# Patient Record
Sex: FEMALE | Race: AMERICAN INDIAN OR ALASKA NATIVE
[De-identification: names, ages, dates, MRNs, and addresses within clinical notes are randomized per-mention and may not be internally consistent; named-entity substitution may affect disease eponyms.]

---

## 2018-04-28 ENCOUNTER — HOSPITAL ENCOUNTER (INPATIENT)
Dept: HOSPITAL 5 - ED | Age: 63
LOS: 3 days | Discharge: HOME | DRG: 392 | End: 2018-05-01
Attending: INTERNAL MEDICINE | Admitting: INTERNAL MEDICINE
Payer: MEDICAID

## 2018-04-28 DIAGNOSIS — Z90.711: ICD-10-CM

## 2018-04-28 DIAGNOSIS — R00.1: ICD-10-CM

## 2018-04-28 DIAGNOSIS — Z96.643: ICD-10-CM

## 2018-04-28 DIAGNOSIS — K22.5: Primary | ICD-10-CM

## 2018-04-28 DIAGNOSIS — Z88.8: ICD-10-CM

## 2018-04-28 DIAGNOSIS — R06.02: ICD-10-CM

## 2018-04-28 DIAGNOSIS — Z87.891: ICD-10-CM

## 2018-04-28 DIAGNOSIS — Z86.73: ICD-10-CM

## 2018-04-28 DIAGNOSIS — Z88.0: ICD-10-CM

## 2018-04-28 DIAGNOSIS — F41.1: ICD-10-CM

## 2018-04-28 DIAGNOSIS — Z90.13: ICD-10-CM

## 2018-04-28 DIAGNOSIS — Z79.82: ICD-10-CM

## 2018-04-28 DIAGNOSIS — M81.0: ICD-10-CM

## 2018-04-28 DIAGNOSIS — I10: ICD-10-CM

## 2018-04-28 DIAGNOSIS — J43.9: ICD-10-CM

## 2018-04-28 LAB
BASOPHILS # (AUTO): 0 K/MM3 (ref 0–0.1)
BASOPHILS NFR BLD AUTO: 1.2 % (ref 0–1.8)
BUN SERPL-MCNC: 11 MG/DL (ref 7–17)
BUN/CREAT SERPL: 16 %
CALCIUM SERPL-MCNC: 9.3 MG/DL (ref 8.4–10.2)
EOSINOPHIL # BLD AUTO: 0.1 K/MM3 (ref 0–0.4)
EOSINOPHIL NFR BLD AUTO: 2.1 % (ref 0–4.3)
HCT VFR BLD CALC: 42.8 % (ref 30.3–42.9)
HEMOLYSIS INDEX: 18
HGB BLD-MCNC: 13.6 GM/DL (ref 10.1–14.3)
LYMPHOCYTES # BLD AUTO: 1.8 K/MM3 (ref 1.2–5.4)
LYMPHOCYTES NFR BLD AUTO: 47.3 % (ref 13.4–35)
MCH RBC QN AUTO: 27 PG (ref 28–32)
MCHC RBC AUTO-ENTMCNC: 32 % (ref 30–34)
MCV RBC AUTO: 84 FL (ref 79–97)
MONOCYTES # (AUTO): 0.3 K/MM3 (ref 0–0.8)
MONOCYTES % (AUTO): 8.7 % (ref 0–7.3)
PLATELET # BLD: 215 K/MM3 (ref 140–440)
RBC # BLD AUTO: 5.08 M/MM3 (ref 3.65–5.03)

## 2018-04-28 PROCEDURE — 70450 CT HEAD/BRAIN W/O DYE: CPT

## 2018-04-28 PROCEDURE — 93010 ELECTROCARDIOGRAM REPORT: CPT

## 2018-04-28 PROCEDURE — 93306 TTE W/DOPPLER COMPLETE: CPT

## 2018-04-28 PROCEDURE — 93005 ELECTROCARDIOGRAM TRACING: CPT

## 2018-04-28 PROCEDURE — 71275 CT ANGIOGRAPHY CHEST: CPT

## 2018-04-28 PROCEDURE — 84484 ASSAY OF TROPONIN QUANT: CPT

## 2018-04-28 PROCEDURE — 96374 THER/PROPH/DIAG INJ IV PUSH: CPT

## 2018-04-28 PROCEDURE — 94640 AIRWAY INHALATION TREATMENT: CPT

## 2018-04-28 PROCEDURE — 36415 COLL VENOUS BLD VENIPUNCTURE: CPT

## 2018-04-28 PROCEDURE — 71045 X-RAY EXAM CHEST 1 VIEW: CPT

## 2018-04-28 PROCEDURE — 80048 BASIC METABOLIC PNL TOTAL CA: CPT

## 2018-04-28 PROCEDURE — 94760 N-INVAS EAR/PLS OXIMETRY 1: CPT

## 2018-04-28 PROCEDURE — 85379 FIBRIN DEGRADATION QUANT: CPT

## 2018-04-28 PROCEDURE — 96375 TX/PRO/DX INJ NEW DRUG ADDON: CPT

## 2018-04-28 PROCEDURE — 85025 COMPLETE CBC W/AUTO DIFF WBC: CPT

## 2018-04-28 PROCEDURE — 83036 HEMOGLOBIN GLYCOSYLATED A1C: CPT

## 2018-04-28 PROCEDURE — 80053 COMPREHEN METABOLIC PANEL: CPT

## 2018-04-28 PROCEDURE — 84439 ASSAY OF FREE THYROXINE: CPT

## 2018-04-28 PROCEDURE — 84443 ASSAY THYROID STIM HORMONE: CPT

## 2018-04-28 PROCEDURE — 93970 EXTREMITY STUDY: CPT

## 2018-04-28 PROCEDURE — A9502 TC99M TETROFOSMIN: HCPCS

## 2018-04-28 PROCEDURE — 93017 CV STRESS TEST TRACING ONLY: CPT

## 2018-04-28 PROCEDURE — 78452 HT MUSCLE IMAGE SPECT MULT: CPT

## 2018-04-28 RX ADMIN — HEPARIN SODIUM SCH UNIT: 5000 INJECTION, SOLUTION INTRAVENOUS; SUBCUTANEOUS at 22:13

## 2018-04-28 RX ADMIN — ALPRAZOLAM PRN MG: 1 TABLET ORAL at 22:03

## 2018-04-28 RX ADMIN — MORPHINE SULFATE PRN MG: 2 INJECTION, SOLUTION INTRAMUSCULAR; INTRAVENOUS at 22:12

## 2018-04-28 RX ADMIN — ONDANSETRON PRN MG: 2 INJECTION INTRAMUSCULAR; INTRAVENOUS at 17:43

## 2018-04-28 RX ADMIN — ONDANSETRON PRN MG: 2 INJECTION INTRAMUSCULAR; INTRAVENOUS at 22:11

## 2018-04-28 RX ADMIN — Medication SCH ML: at 22:13

## 2018-04-28 RX ADMIN — DICYCLOMINE HYDROCHLORIDE SCH MG: 10 CAPSULE ORAL at 22:40

## 2018-04-28 RX ADMIN — ASPIRIN SCH MG: 325 TABLET ORAL at 17:44

## 2018-04-28 NOTE — XRAY REPORT
FINAL REPORT



PROCEDURE:  XR CHEST 1V AP



TECHNIQUE:  Chest radiograph anteroposterior view. CPT 24349







HISTORY:  chest pain 



COMPARISON:  No prior studies are available for comparison.



FINDINGS:  

Heart: Normal.



Mediastinum/Vessels: Normal.



Lungs/Pleural space: No infiltrate, effusion, or pneumothorax.



Bony thorax: No acute osseous abnormality.



Life support devices: None.



IMPRESSION:  

No radiographic evidence of acute cardiopulmonary abnormality.

## 2018-04-28 NOTE — HISTORY AND PHYSICAL REPORT
History of Present Illness


Date of examination: 04/28/18


Date of admission: 


04/28/18





Chief complaint: 


Chief complaint:


Left-sided chest pain





History of present illness: 


BHAVANI:


63-year-old female with history of hypertension COPD and questionable 

cerebrovascular accident in the past comes in for left-sided chest pain.  His 

been going on since yesterday.  Also radiate into her left neck and left upper 

extremity.  Her chest.  Pressure is associated with shortness of breath and 

diaphoresis.  No palpitations.  No nausea no vomiting.  His been going on since 

yesterday.  Evening this morning is worsened and he gives a score of 48 or 10 

her last stress test was in 2015.  Patient says he may have a left heart 

catheterization.  No exacerbating or relieving factors.





Past Medical History


Hx Hypertension: Yes


Hx CVA: Yes (TIA)


Hx COPD: Yes


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 

Cholesterol





 Surgical History


Hx Cholecystectomy: Yes


Hx Breast Surgery: Yes (Bilateral Mastectomy)


Additional Surgical History: Bilateral Hip Replacememt, Partial Hysterectomy





- Family History


Family history: no significant





- Social History


Smoking Status: Former Smoker (none 20 years)


Substance Use Type: Marijuana





- Medications


Home Medications: 


 Home Medications











 Medication  Instructions  Recorded  Confirmed  Last Taken  Type


 


ALPRAZolam 1 mg PO TID PRN 08/23/15 08/23/15 Unknown History


 


Aspirin BABY CHEW TAB 81 mg PO DAILY 08/23/15 08/23/15 Unknown History


 


AtorvaSTATin 80 mg PO HS 08/23/15 08/23/15 Unknown History


 


Bentyl 10 mg PO TID 08/23/15 08/23/15 Unknown History


 


Lisinopril/Hydrochlorothiazide 1 tab PO DAILY 08/23/15 08/23/15 Unknown History


 


HYDROcodone/APAP 5-325 [[place] 1 each PO Q4HR PRN #20 tablet 08/26/15  Unknown Rx





5/325]     














 Review of Systems


ROS: 


Stated complaint: CHEST PAIN


Other details as noted in HPI





Constitutional: diaphoresis


Respiratory: shortness of breath


Cardiovascular: chest pain


Gastrointestinal: denies: nausea, vomiting


Neurological: numbness


14 point review of systems done--- essentially negative








Medications and Allergies


 Allergies











Allergy/AdvReac Type Severity Reaction Status Date / Time


 


cefdinir [From Omnicef] Allergy  Hives Verified 08/23/15 14:49


 


Penicillins Allergy  Unknown Verified 04/28/18 13:26











 Home Medications











 Medication  Instructions  Recorded  Confirmed  Last Taken  Type


 


ALPRAZolam 1 mg PO TID PRN 08/23/15 04/28/18 Unknown History


 


Aspirin BABY CHEW TAB 81 mg PO DAILY 08/23/15 04/28/18 Unknown History


 


AtorvaSTATin 80 mg PO HS 08/23/15 04/28/18 Unknown History


 


Bentyl 10 mg PO TID 08/23/15 04/28/18 Unknown History


 


Lisinopril/Hydrochlorothiazide 1 tab PO DAILY 08/23/15 04/28/18 Unknown History


 


HYDROcodone/APAP 5-325 [T[place] 1 each PO Q4HR PRN #20 tablet 08/26/15 04/28/18 

Unknown Rx





5/325]     


 


Hydrochlorothiazide [HCTZ] 25 mg PO QDAY 04/28/18 04/28/18 Unknown History














Exam





- Physical Exam


Narrative exam: 


Lying in bed comfortably








- Constitutional


Vitals: 


 











Temp Pulse Resp BP Pulse Ox


 


 98.2 F   53 L  12   172/82   95 


 


 04/28/18 14:38  04/28/18 15:15  04/28/18 15:15  04/28/18 15:15  04/28/18 15:15











General appearance: Present: no acute distress, well-nourished





- EENT


Eyes: Present: PERRL


ENT: hearing intact, clear oral mucosa





- Neck


Neck: Present: supple, normal ROM





- Respiratory


Respiratory effort: normal


Respiratory: bilateral: CTA





- Cardiovascular


Heart rate: 51


Rhythm: regular


Heart Sounds: Present: S1 & S2.  Absent: rub, click





- Extremities


Extremities: no ischemia, pulses intact, pulses symmetrical, No edema


Peripheral Pulses: within normal limits





- Abdominal


General gastrointestinal: Present: soft, non-tender, non-distended, normal 

bowel sounds


Female genitourinary: Present: normal





- Integumentary


Integumentary: Present: clear, warm, dry





- Musculoskeletal


Musculoskeletal: gait normal, strength equal bilaterally





- Psychiatric


Psychiatric: appropriate mood/affect, intact judgment & insight





- Neurologic


Neurologic: CNII-XII intact, moves all extremities





- Allied Health


Allied health notes reviewed: nursing, case management





Results





- Labs


CBC & Chem 7: 


 04/28/18 14:20





 04/28/18 14:20


Labs: 


 Laboratory Last Values











WBC  3.8 K/mm3 (4.5-11.0)  L  04/28/18  14:20    


 


RBC  5.08 M/mm3 (3.65-5.03)  H  04/28/18  14:20    


 


Hgb  13.6 gm/dl (10.1-14.3)   04/28/18  14:20    


 


Hct  42.8 % (30.3-42.9)   04/28/18  14:20    


 


MCV  84 fl (79-97)   04/28/18  14:20    


 


MCH  27 pg (28-32)  L  04/28/18  14:20    


 


MCHC  32 % (30-34)   04/28/18  14:20    


 


RDW  15.8 % (13.2-15.2)  H  04/28/18  14:20    


 


Plt Count  215 K/mm3 (140-440)   04/28/18  14:20    


 


Lymph % (Auto)  47.3 % (13.4-35.0)  H  04/28/18  14:20    


 


Mono % (Auto)  8.7 % (0.0-7.3)  H  04/28/18  14:20    


 


Eos % (Auto)  2.1 % (0.0-4.3)   04/28/18  14:20    


 


Baso % (Auto)  1.2 % (0.0-1.8)   04/28/18  14:20    


 


Lymph #  1.8 K/mm3 (1.2-5.4)   04/28/18  14:20    


 


Mono #  0.3 K/mm3 (0.0-0.8)   04/28/18  14:20    


 


Eos #  0.1 K/mm3 (0.0-0.4)   04/28/18  14:20    


 


Baso #  0.0 K/mm3 (0.0-0.1)   04/28/18  14:20    


 


Seg Neutrophils %  40.7 % (40.0-70.0)   04/28/18  14:20    


 


Seg Neutrophils #  1.5 K/mm3 (1.8-7.7)  L  04/28/18  14:20    


 


Sodium  137 mmol/L (137-145)   04/28/18  14:20    


 


Potassium  4.0 mmol/L (3.6-5.0)   04/28/18  14:20    


 


Chloride  101.0 mmol/L ()   04/28/18  14:20    


 


Carbon Dioxide  22 mmol/L (22-30)   04/28/18  14:20    


 


Anion Gap  18 mmol/L  04/28/18  14:20    


 


BUN  11 mg/dL (7-17)   04/28/18  14:20    


 


Creatinine  0.7 mg/dL (0.7-1.2)   04/28/18  14:20    


 


Estimated GFR  > 60 ml/min  04/28/18  14:20    


 


BUN/Creatinine Ratio  16 %  04/28/18  14:20    


 


Glucose  78 mg/dL ()   04/28/18  14:20    


 


Calcium  9.3 mg/dL (8.4-10.2)   04/28/18  14:20    


 


Troponin T  < 0.010 ng/mL (0.00-0.029)   04/28/18  15:44    








 Short CBC











  04/28/18 Range/Units





  14:20 


 


WBC  3.8 L  (4.5-11.0)  K/mm3


 


Hgb  13.6  (10.1-14.3)  gm/dl


 


Hct  42.8  (30.3-42.9)  %


 


Plt Count  215  (140-440)  K/mm3








 BMP











  04/28/18





  14:20


 


Sodium  137


 


Potassium  4.0


 


Chloride  101.0


 


Carbon Dioxide  22


 


BUN  11


 


Creatinine  0.7


 


Glucose  78


 


Calcium  9.3








 Cardiac Enzymes











  04/28/18 04/28/18 04/28/18 Range/Units





  14:20 15:44 17:01 


 


Troponin T  < 0.010  < 0.010  < 0.010  (0.00-0.029)  ng/mL














- Imaging and Cardiology


EKG: report reviewed (sinus bradycardia heart rate of 51/m no acute ST-T wave 

changes EKG interpreted by me)





Assessment and Plan


Advance Directives: Yes (full code)


VTE prophylaxis?: Chemical


Plan of care discussed with patient/family: Yes





- Patient Problems


(1) Chest pain


Current Visit: Yes   Status: Acute   


Qualifiers: 


   Chest pain type: unspecified   Qualified Code(s): R07.9 - Chest pain, 

unspecified   


Plan to address problem: 


Chest pain workup including serial cardiac enzymes and Lexiscan.  

Costochondritis ruled out


Gerd in differential diagnosis











(2) Hypertension


Current Visit: No   Status: Chronic   


Qualifiers: 


   Hypertension type: essential hypertension   Qualified Code(s): I10 - 

Essential (primary) hypertension   


Plan to address problem: 


Continue antihypertensives








(3) Hyperlipidemia


Current Visit: Yes   Status: Chronic   


Qualifiers: 


   Hyperlipidemia type: mixed hyperlipidemia   Qualified Code(s): E78.2 - Mixed 

hyperlipidemia   


Plan to address problem: 


Continue statins








(4) Generalized anxiety disorder


Current Visit: Yes   Status: Chronic   


Plan to address problem: 


Continue Xanax 1 mg 3 times a day








(5) DVT prophylaxis


Current Visit: Yes   Status: Acute   


Plan to address problem: 


Lovenox/heparin subcutaneously


GI prophylaxis in the form of famotidine

## 2018-04-29 LAB
ALBUMIN SERPL-MCNC: 3.3 G/DL (ref 3.9–5)
ALT SERPL-CCNC: 8 UNITS/L (ref 7–56)
BASOPHILS # (AUTO): 0.1 K/MM3 (ref 0–0.1)
BASOPHILS NFR BLD AUTO: 1.6 % (ref 0–1.8)
BUN SERPL-MCNC: 19 MG/DL (ref 7–17)
BUN/CREAT SERPL: 19 %
CALCIUM SERPL-MCNC: 8.7 MG/DL (ref 8.4–10.2)
EOSINOPHIL # BLD AUTO: 0.2 K/MM3 (ref 0–0.4)
EOSINOPHIL NFR BLD AUTO: 5.2 % (ref 0–4.3)
HCT VFR BLD CALC: 37.9 % (ref 30.3–42.9)
HEMOLYSIS INDEX: 2
HGB BLD-MCNC: 12.4 GM/DL (ref 10.1–14.3)
LYMPHOCYTES # BLD AUTO: 1.8 K/MM3 (ref 1.2–5.4)
LYMPHOCYTES NFR BLD AUTO: 45.5 % (ref 13.4–35)
MCH RBC QN AUTO: 27 PG (ref 28–32)
MCHC RBC AUTO-ENTMCNC: 33 % (ref 30–34)
MCV RBC AUTO: 82 FL (ref 79–97)
MONOCYTES # (AUTO): 0.4 K/MM3 (ref 0–0.8)
MONOCYTES % (AUTO): 10.3 % (ref 0–7.3)
PLATELET # BLD: 186 K/MM3 (ref 140–440)
RBC # BLD AUTO: 4.6 M/MM3 (ref 3.65–5.03)

## 2018-04-29 RX ADMIN — IPRATROPIUM BROMIDE AND ALBUTEROL SULFATE SCH AMPUL: .5; 3 SOLUTION RESPIRATORY (INHALATION) at 19:18

## 2018-04-29 RX ADMIN — DICYCLOMINE HYDROCHLORIDE SCH MG: 10 CAPSULE ORAL at 11:08

## 2018-04-29 RX ADMIN — MORPHINE SULFATE PRN MG: 2 INJECTION, SOLUTION INTRAMUSCULAR; INTRAVENOUS at 15:56

## 2018-04-29 RX ADMIN — Medication SCH ML: at 22:02

## 2018-04-29 RX ADMIN — HEPARIN SODIUM SCH UNIT: 5000 INJECTION, SOLUTION INTRAVENOUS; SUBCUTANEOUS at 10:48

## 2018-04-29 RX ADMIN — ASPIRIN SCH MG: 325 TABLET ORAL at 10:52

## 2018-04-29 RX ADMIN — MORPHINE SULFATE PRN MG: 2 INJECTION, SOLUTION INTRAMUSCULAR; INTRAVENOUS at 22:00

## 2018-04-29 RX ADMIN — MORPHINE SULFATE PRN MG: 2 INJECTION, SOLUTION INTRAMUSCULAR; INTRAVENOUS at 05:34

## 2018-04-29 RX ADMIN — ONDANSETRON PRN MG: 2 INJECTION INTRAMUSCULAR; INTRAVENOUS at 10:51

## 2018-04-29 RX ADMIN — ONDANSETRON PRN MG: 2 INJECTION INTRAMUSCULAR; INTRAVENOUS at 05:36

## 2018-04-29 RX ADMIN — MORPHINE SULFATE PRN MG: 2 INJECTION, SOLUTION INTRAMUSCULAR; INTRAVENOUS at 11:06

## 2018-04-29 RX ADMIN — DICYCLOMINE HYDROCHLORIDE SCH MG: 10 CAPSULE ORAL at 19:39

## 2018-04-29 RX ADMIN — LOSARTAN POTASSIUM SCH MG: 50 TABLET, FILM COATED ORAL at 11:08

## 2018-04-29 RX ADMIN — ALPRAZOLAM PRN MG: 1 TABLET ORAL at 20:42

## 2018-04-29 RX ADMIN — IPRATROPIUM BROMIDE AND ALBUTEROL SULFATE SCH AMPUL: .5; 3 SOLUTION RESPIRATORY (INHALATION) at 16:07

## 2018-04-29 RX ADMIN — Medication SCH ML: at 11:09

## 2018-04-29 RX ADMIN — DICYCLOMINE HYDROCHLORIDE SCH MG: 10 CAPSULE ORAL at 15:56

## 2018-04-29 RX ADMIN — ONDANSETRON PRN MG: 2 INJECTION INTRAMUSCULAR; INTRAVENOUS at 22:01

## 2018-04-29 RX ADMIN — HEPARIN SODIUM SCH UNIT: 5000 INJECTION, SOLUTION INTRAVENOUS; SUBCUTANEOUS at 21:26

## 2018-04-29 NOTE — PROGRESS NOTE
Assessment and Plan


Assessment and plan: 


63-year-old -American female with past medical history significant for 

TIA, bradycardia, hypertension presented to the emergency department with 

complaints of chest pain and shortness of breath that started 2 days ago.


Chest pain or shortness of breath


- Cardiac enzymes were negative, stress test was negative


- Patient has some wheezing and will have treatment


- Cardiology consulted, echocardiogram, be done outpatient if needed 


Hypertension


- Controlled continue home medication


Hyperlipidemia


- Continue statin


History of TIA


- Currently stable


DVT prophylaxis


- heparin


Disposition


- Possible discharge tomorrow once symptoms are controlled








History


Interval history: 





Patient was seen and evaluated this morning, chest pain is getting better.  

Patient is complaining shortness of breath.





Hospitalist Physical





- Physical exam


Narrative exam: 





 Not in cardiopulmonary distress. 


 The patient appeared well nourished and normally developed.


 Vital signs as documented.


 Head exam is unremarkable.


 No scleral icterus .


 Neck is without jugular venous distension, thyromegaly, or carotid bruits. 


 Lungs scattered wheezes.


Cardiac exam reveals regular rate and  Rhythm. First and second heart sounds 

normal. No murmurs, rubs or gallops. 


Abdominal exam reveals normal bowel sounds, no masses, no organomegaly and no 

aortic enlargement. 


Extremities are nonedematous and both femoral and pedal pulses are normal.


CNS: Alert and oriented 3.  No focal weakness.





- Constitutional


Vitals: 


 











Temp Pulse Resp BP Pulse Ox


 


 97.4 F L  58 L  20   146/65   100 


 


 04/29/18 09:12  04/29/18 11:08  04/29/18 11:06  04/29/18 11:08  04/29/18 09:12











General appearance: Present: no acute distress, well-nourished





Results





- Labs


CBC & Chem 7: 


 04/29/18 07:10





 04/29/18 07:10


Labs: 


 Laboratory Last Values











WBC  3.9 K/mm3 (4.5-11.0)  L  04/29/18  07:10    


 


RBC  4.60 M/mm3 (3.65-5.03)   04/29/18  07:10    


 


Hgb  12.4 gm/dl (10.1-14.3)   04/29/18  07:10    


 


Hct  37.9 % (30.3-42.9)   04/29/18  07:10    


 


MCV  82 fl (79-97)   04/29/18  07:10    


 


MCH  27 pg (28-32)  L  04/29/18  07:10    


 


MCHC  33 % (30-34)   04/29/18  07:10    


 


RDW  15.8 % (13.2-15.2)  H  04/29/18  07:10    


 


Plt Count  186 K/mm3 (140-440)   04/29/18  07:10    


 


Lymph % (Auto)  45.5 % (13.4-35.0)  H  04/29/18  07:10    


 


Mono % (Auto)  10.3 % (0.0-7.3)  H  04/29/18  07:10    


 


Eos % (Auto)  5.2 % (0.0-4.3)  H  04/29/18  07:10    


 


Baso % (Auto)  1.6 % (0.0-1.8)   04/29/18  07:10    


 


Lymph #  1.8 K/mm3 (1.2-5.4)   04/29/18  07:10    


 


Mono #  0.4 K/mm3 (0.0-0.8)   04/29/18  07:10    


 


Eos #  0.2 K/mm3 (0.0-0.4)   04/29/18  07:10    


 


Baso #  0.1 K/mm3 (0.0-0.1)   04/29/18  07:10    


 


Seg Neutrophils %  37.4 % (40.0-70.0)  L  04/29/18  07:10    


 


Seg Neutrophils #  1.5 K/mm3 (1.8-7.7)  L  04/29/18  07:10    


 


Sodium  140 mmol/L (137-145)   04/29/18  07:10    


 


Potassium  4.4 mmol/L (3.6-5.0)   04/29/18  07:10    


 


Chloride  102.0 mmol/L ()   04/29/18  07:10    


 


Carbon Dioxide  26 mmol/L (22-30)   04/29/18  07:10    


 


Anion Gap  16 mmol/L  04/29/18  07:10    


 


BUN  19 mg/dL (7-17)  H  04/29/18  07:10    


 


Creatinine  1.0 mg/dL (0.7-1.2)   04/29/18  07:10    


 


Estimated GFR  > 60 ml/min  04/29/18  07:10    


 


BUN/Creatinine Ratio  19 %  04/29/18  07:10    


 


Glucose  84 mg/dL ()   04/29/18  07:10    


 


Hemoglobin A1c  5.9 % (4-6)   04/28/18  17:01    


 


Calcium  8.7 mg/dL (8.4-10.2)   04/29/18  07:10    


 


Total Bilirubin  0.30 mg/dL (0.1-1.2)   04/29/18  07:10    


 


AST  12 units/L (5-40)   04/29/18  07:10    


 


ALT  8 units/L (7-56)   04/29/18  07:10    


 


Alkaline Phosphatase  89 units/L ()   04/29/18  07:10    


 


Troponin T  < 0.010 ng/mL (0.00-0.029)   04/29/18  07:10    


 


Total Protein  6.0 g/dL (6.3-8.2)  L  04/29/18  07:10    


 


Albumin  3.3 g/dL (3.9-5)  L  04/29/18  07:10    


 


Albumin/Globulin Ratio  1.2 %  04/29/18  07:10

## 2018-04-29 NOTE — CONSULTATION
CARDIOLOGY CONSULTATION



REFERRING PHYSICIAN:  Dr. Mackey.



REASON FOR CONSULTATION:  Advice and opinion regarding chest pain.



HISTORY OF PRESENT ILLNESS:  The patient is a pleasant 63-year-old

-American female, who is a retired nurse with history of hypertension,

emphysema, questionable CVA and DVT in the past, presents here with shortness of

breath, chest pain radiating to the left shoulder, sharp and pressure-like.  No

exacerbating or alleviating factors.  It is not positional.  Currently, seen on

telemetry.  [facility] has done stress test this morning.  She

denies any syncope or presyncope.  Retired as a nurse, worked mainly in

[place].  Feels good now.  She has emphysema of unclear etiology.  Her

primary care physician is  ____.



PAST MEDICAL HISTORY:  As aforementioned, smoked many years ago.



PAST SURGICAL HISTORY:  Includes bilateral mastectomy, bilateral hip

replacement, partial hysterectomy.  Other medical history includes TIA, colitis,

osteoporosis and hyperlipidemia.



MEDICATIONS:  Inpatient and outpatient medications reviewed.



ALLERGIES:  ALLERGIC TO OMNICEF AND PENICILLIN.



PHYSICAL EXAMINATION:

VITAL SIGNS:  Blood pressure is 140/60.  Tele reveals sinus rhythm, had some

sinus bradycardia during sleep in the 40s.  No high-degree AV block or SA node

dysfunction noted.  O2 sat is 98% on room air.

GENERAL:  This is a middle-aged -American female in no apparent distress,

oriented x 3.

HEENT:  Sclerae are anicteric.

NECK:  Supple.  No mass or JVD.

CHEST:  Mild bibasilar wheezing.  No rales, no rhonchi.

EXTREMITIES:  No cyanosis, clubbing, edema.  Good peripheral pulses.

SKIN:  Intact.  No rashes.



LABORATORY DATA:  Platelet count is 186.  WBC is 3.9, hemoglobin 12.4,

hematocrit 37.9.  We have ordered a D-dimer, BMP is normal.  Cardiac enzymes are

negative x 2.



EKG reveals sinus bradycardia, no acute ST segment shift.  I reviewed her stress

test, grossly technically somewhat difficult, but no evidence of a significant

degree of ischemia or prior infarction, normal LV function.  No TID.



ASSESSMENT:  In summary, the patient is a pleasant 63-year-old -American

female:

1.  Chest pain with typical and atypical features, now resolved.  Cardiac

enzymes negative x 2.  Nuclear stress test technically difficult, but grossly

without evidence of ischemia.  Normal LV function.

2.  History of cerebrovascular accident.

3.  History of deep venous thrombosis.

4.  Hypertension.

5.  Hyperlipidemia.



At this point, she is clinically stable, although she does have minor bibasilar

wheezing.  Would initiate pulmonary nebulizer therapy, consider pulmonary

consultation.  We will watch her rhythm overnight.  We will check an

echocardiogram in the office.  She appears to be clinically stable.  Follow up

on D-dimer.  She does have a history of DVT, but no other clinical factors

suggestive of pulmonary embolus.  We will watch overnight.  Consider a.m.

discharge if she remains stable.



Thank you for this consultation.  I will be happy to follow along with you.





DD: 04/29/2018 12:25

DT: 04/29/2018 23:51

JOB# [id number]  [id number]

IDANIA/JAY

## 2018-04-30 RX ADMIN — ONDANSETRON PRN MG: 2 INJECTION INTRAMUSCULAR; INTRAVENOUS at 09:05

## 2018-04-30 RX ADMIN — MORPHINE SULFATE PRN MG: 2 INJECTION, SOLUTION INTRAMUSCULAR; INTRAVENOUS at 09:06

## 2018-04-30 RX ADMIN — ONDANSETRON PRN MG: 2 INJECTION INTRAMUSCULAR; INTRAVENOUS at 16:40

## 2018-04-30 RX ADMIN — IPRATROPIUM BROMIDE AND ALBUTEROL SULFATE SCH AMPUL: .5; 3 SOLUTION RESPIRATORY (INHALATION) at 13:24

## 2018-04-30 RX ADMIN — Medication SCH ML: at 10:38

## 2018-04-30 RX ADMIN — MORPHINE SULFATE PRN MG: 2 INJECTION, SOLUTION INTRAMUSCULAR; INTRAVENOUS at 21:36

## 2018-04-30 RX ADMIN — LOSARTAN POTASSIUM SCH MG: 50 TABLET, FILM COATED ORAL at 19:05

## 2018-04-30 RX ADMIN — MORPHINE SULFATE PRN MG: 2 INJECTION, SOLUTION INTRAMUSCULAR; INTRAVENOUS at 16:40

## 2018-04-30 RX ADMIN — DICYCLOMINE HYDROCHLORIDE SCH: 10 CAPSULE ORAL at 14:00

## 2018-04-30 RX ADMIN — ASPIRIN SCH MG: 325 TABLET ORAL at 09:05

## 2018-04-30 RX ADMIN — HEPARIN SODIUM SCH UNIT: 5000 INJECTION, SOLUTION INTRAVENOUS; SUBCUTANEOUS at 10:30

## 2018-04-30 RX ADMIN — DICYCLOMINE HYDROCHLORIDE SCH MG: 10 CAPSULE ORAL at 21:36

## 2018-04-30 RX ADMIN — ALPRAZOLAM PRN MG: 1 TABLET ORAL at 21:36

## 2018-04-30 RX ADMIN — HEPARIN SODIUM SCH UNIT: 5000 INJECTION, SOLUTION INTRAVENOUS; SUBCUTANEOUS at 21:35

## 2018-04-30 RX ADMIN — LOSARTAN POTASSIUM SCH: 50 TABLET, FILM COATED ORAL at 10:42

## 2018-04-30 RX ADMIN — IPRATROPIUM BROMIDE AND ALBUTEROL SULFATE SCH: .5; 3 SOLUTION RESPIRATORY (INHALATION) at 01:08

## 2018-04-30 RX ADMIN — ONDANSETRON PRN MG: 2 INJECTION INTRAMUSCULAR; INTRAVENOUS at 21:36

## 2018-04-30 RX ADMIN — MORPHINE SULFATE PRN MG: 2 INJECTION, SOLUTION INTRAMUSCULAR; INTRAVENOUS at 03:55

## 2018-04-30 RX ADMIN — IPRATROPIUM BROMIDE AND ALBUTEROL SULFATE SCH AMPUL: .5; 3 SOLUTION RESPIRATORY (INHALATION) at 22:04

## 2018-04-30 RX ADMIN — DICYCLOMINE HYDROCHLORIDE SCH MG: 10 CAPSULE ORAL at 09:05

## 2018-04-30 RX ADMIN — IPRATROPIUM BROMIDE AND ALBUTEROL SULFATE SCH AMPUL: .5; 3 SOLUTION RESPIRATORY (INHALATION) at 09:10

## 2018-04-30 NOTE — PROGRESS NOTE
Assessment and Plan


Assessment:


Chest pain - with typical and atypical features, AMI ruled out, nuclear stress 

test TDS but grossly without evidence of 


   ischemia, normal LV function


Sinus bradycardia - improving; asymptomatic 


HTN


HLP


H/o CVA


H/o DVT


H/o TIA


Elevated DDimer - chest CTA negative for PE





Plan:


Chest CTA with contrast this AM showed emphysematous changes, no evidence for 

PE. 


Obtain echo. 


Obtain thyroid profile. 


Recommend pulmonary consultation per primary. 





The patient has been seen in conjunction with Dr. Vigil who agrees with the 

assessment and plan of care.








Subjective


Date of service: 04/30/18


Principal diagnosis: cp


Interval history: 


pt resting in bed, still c/o intermittent midsternal stabbing pain which 

radiates into her upper back and states she heard herself wheezing this 

morning. 








Objective





  Last Vital Signs











Temp  97.8 F   04/30/18 07:49


 


Pulse  49 L  04/30/18 09:30


 


Resp  20   04/30/18 09:30


 


BP  131/52   04/30/18 07:49


 


Pulse Ox  96   04/30/18 07:51

















- Physical Examination


General: No Apparent Distress


HEENT: Positive: PERRL, Normocephaly, Mucus Membranes Moist


Neck: Positive: neck supple, trachea midline


Cardiac: Positive: Regular Rhythm, S1/S2


Lungs: Positive: clear to auscultation


Neuro: Positive: Grossly Intact, Cranial Nerve 2-12 Intact


Abdomen: Positive: Soft.  Negative: Tender


Skin: Positive: Clear.  Negative: Rash, Wound


Musculoskeletal: No Fluid Collection, No Pain, Normal Range of Motion


Extremities: Absent: edema





- Imaging and Cardiology


EKG: report reviewed (sinus bradycardia heart rate of 51/m no acute ST-T wave 

changes EKG interpreted by me)


Pharmacologic stress test: report reviewed





- Telemetry


EKG Rhythm: Sinus Bradycardia

## 2018-04-30 NOTE — CAT SCAN REPORT
CTA CHEST:



HISTORY: chest pain.



COMPARISON: 8/24/15.



TECHNIQUE: Helical CT in 1.25mm intervals following IV contrast.  

Pulmonary embolus protocol.  Sagittal and coronal reformatted images.  

Rotational MIP images.



FINDINGS:





Contrast bolus is satisfactory.  No pulmonary embolus is identified.



Thyroid gland: 1.3 cm left thyroid hypodensity is unchanged and 

probably represents a cyst. The right thyroid lobe remains normal.



Tracheobronchial tree: Normal.



Esophagus: Normal.



Heart: Mild cardiomegaly is stable.



Pericardium: Normal.



Mediastinum: Normal.



Lung Fields: Moderate centrilobular emphysematous changes in the upper 

lung zones are stable. Mild bibasilar atelectatic changes are noted. No 

evidence for mass or infiltrate.



Pleural Spaces: Normal.



Musculoskeletal: Mild thoracic spondylosis. No evidence for suspicious 

bony lesion or fracture. Bilateral breast prostheses are intact.





IMPRESSION: 

No evidence for pulmonary embolus.  

Emphysematous changes.

Mild cardiomegaly.

Mild bibasilar atelectasis.

## 2018-04-30 NOTE — PROGRESS NOTE
Assessment and Plan


Assessment and plan: 


63-year-old -American female with past medical history significant for 

TIA, bradycardia, hypertension presented to the emergency department with 

complaints of chest pain and shortness of breath that started 2 days ago.


Chest pain or shortness of breath


- Cardiac enzymes were negative, stress test was negative patient still has 

chest pain and SOB


- Cardiology consulted and recommend echo, pulmonary consult for emphysema


Emphysema


- Breathing treatment


- Pulmonary consult


- CTA negative for PE


Hypertension


- Controlled continue home medication


Hyperlipidemia


- Continue statin


History of TIA


- Currently stable


DVT prophylaxis


- heparin


Disposition


-continue inpatient care, f/u with pulmonary.








History


Interval history: 





Patient was seen and evaluated this morning, chest pain is getting better.  

Patient is complaining shortness of breath and chest pain.





Hospitalist Physical





- Physical exam


Narrative exam: 





 Not in cardiopulmonary distress. 


 The patient appeared well nourished and normally developed.


 Vital signs as documented.


 Head exam is unremarkable.


 No scleral icterus .


 Neck is without jugular venous distension, thyromegaly, or carotid bruits. 


 Lungs scattered wheezes.


Cardiac exam reveals regular rate and  Rhythm. First and second heart sounds 

normal. No murmurs, rubs or gallops. 


Abdominal exam reveals normal bowel sounds, no masses, no organomegaly and no 

aortic enlargement. 


Extremities are nonedematous and both femoral and pedal pulses are normal.


CNS: Alert and oriented 3.  No focal weakness.





- Constitutional


Vitals: 


 











Temp Pulse Resp BP Pulse Ox


 


 97.8 F   50 L  20   131/52   96 


 


 04/30/18 07:49  04/30/18 13:45  04/30/18 13:45  04/30/18 07:49  04/30/18 07:51











General appearance: Present: no acute distress, well-nourished





Results





- Labs


CBC & Chem 7: 


 04/29/18 07:10





 04/29/18 07:10


Labs: 


 Laboratory Last Values











WBC  3.9 K/mm3 (4.5-11.0)  L  04/29/18  07:10    


 


RBC  4.60 M/mm3 (3.65-5.03)   04/29/18  07:10    


 


Hgb  12.4 gm/dl (10.1-14.3)   04/29/18  07:10    


 


Hct  37.9 % (30.3-42.9)   04/29/18  07:10    


 


MCV  82 fl (79-97)   04/29/18  07:10    


 


MCH  27 pg (28-32)  L  04/29/18  07:10    


 


MCHC  33 % (30-34)   04/29/18  07:10    


 


RDW  15.8 % (13.2-15.2)  H  04/29/18  07:10    


 


Plt Count  186 K/mm3 (140-440)   04/29/18  07:10    


 


Lymph % (Auto)  45.5 % (13.4-35.0)  H  04/29/18  07:10    


 


Mono % (Auto)  10.3 % (0.0-7.3)  H  04/29/18  07:10    


 


Eos % (Auto)  5.2 % (0.0-4.3)  H  04/29/18  07:10    


 


Baso % (Auto)  1.6 % (0.0-1.8)   04/29/18  07:10    


 


Lymph #  1.8 K/mm3 (1.2-5.4)   04/29/18  07:10    


 


Mono #  0.4 K/mm3 (0.0-0.8)   04/29/18  07:10    


 


Eos #  0.2 K/mm3 (0.0-0.4)   04/29/18  07:10    


 


Baso #  0.1 K/mm3 (0.0-0.1)   04/29/18  07:10    


 


Seg Neutrophils %  37.4 % (40.0-70.0)  L  04/29/18  07:10    


 


Seg Neutrophils #  1.5 K/mm3 (1.8-7.7)  L  04/29/18  07:10    


 


D-Dimer  348.49 ng/mlDDU (0-234)  H  04/29/18  13:38    


 


Sodium  140 mmol/L (137-145)   04/29/18  07:10    


 


Potassium  4.4 mmol/L (3.6-5.0)   04/29/18  07:10    


 


Chloride  102.0 mmol/L ()   04/29/18  07:10    


 


Carbon Dioxide  26 mmol/L (22-30)   04/29/18  07:10    


 


Anion Gap  16 mmol/L  04/29/18  07:10    


 


BUN  19 mg/dL (7-17)  H  04/29/18  07:10    


 


Creatinine  1.0 mg/dL (0.7-1.2)   04/29/18  07:10    


 


Estimated GFR  > 60 ml/min  04/29/18  07:10    


 


BUN/Creatinine Ratio  19 %  04/29/18  07:10    


 


Glucose  84 mg/dL ()   04/29/18  07:10    


 


Hemoglobin A1c  5.9 % (4-6)   04/28/18  17:01    


 


Calcium  8.7 mg/dL (8.4-10.2)   04/29/18  07:10    


 


Total Bilirubin  0.30 mg/dL (0.1-1.2)   04/29/18  07:10    


 


AST  12 units/L (5-40)   04/29/18  07:10    


 


ALT  8 units/L (7-56)   04/29/18  07:10    


 


Alkaline Phosphatase  89 units/L ()   04/29/18  07:10    


 


Troponin T  < 0.010 ng/mL (0.00-0.029)   04/29/18  07:10    


 


Total Protein  6.0 g/dL (6.3-8.2)  L  04/29/18  07:10    


 


Albumin  3.3 g/dL (3.9-5)  L  04/29/18  07:10    


 


Albumin/Globulin Ratio  1.2 %  04/29/18  07:10

## 2018-05-01 VITALS — SYSTOLIC BLOOD PRESSURE: 147 MMHG | DIASTOLIC BLOOD PRESSURE: 70 MMHG

## 2018-05-01 RX ADMIN — IPRATROPIUM BROMIDE AND ALBUTEROL SULFATE SCH AMPUL: .5; 3 SOLUTION RESPIRATORY (INHALATION) at 01:56

## 2018-05-01 RX ADMIN — ONDANSETRON PRN MG: 2 INJECTION INTRAMUSCULAR; INTRAVENOUS at 04:35

## 2018-05-01 RX ADMIN — Medication SCH: at 14:48

## 2018-05-01 RX ADMIN — ONDANSETRON PRN MG: 2 INJECTION INTRAMUSCULAR; INTRAVENOUS at 09:19

## 2018-05-01 RX ADMIN — IPRATROPIUM BROMIDE AND ALBUTEROL SULFATE SCH AMPUL: .5; 3 SOLUTION RESPIRATORY (INHALATION) at 07:54

## 2018-05-01 RX ADMIN — MORPHINE SULFATE PRN MG: 2 INJECTION, SOLUTION INTRAMUSCULAR; INTRAVENOUS at 15:34

## 2018-05-01 RX ADMIN — HEPARIN SODIUM SCH UNIT: 5000 INJECTION, SOLUTION INTRAVENOUS; SUBCUTANEOUS at 09:19

## 2018-05-01 RX ADMIN — DICYCLOMINE HYDROCHLORIDE SCH MG: 10 CAPSULE ORAL at 09:18

## 2018-05-01 RX ADMIN — ONDANSETRON PRN MG: 2 INJECTION INTRAMUSCULAR; INTRAVENOUS at 15:34

## 2018-05-01 RX ADMIN — LOSARTAN POTASSIUM SCH MG: 50 TABLET, FILM COATED ORAL at 09:17

## 2018-05-01 RX ADMIN — MORPHINE SULFATE PRN MG: 2 INJECTION, SOLUTION INTRAMUSCULAR; INTRAVENOUS at 09:18

## 2018-05-01 RX ADMIN — MORPHINE SULFATE PRN MG: 2 INJECTION, SOLUTION INTRAMUSCULAR; INTRAVENOUS at 04:35

## 2018-05-01 RX ADMIN — IPRATROPIUM BROMIDE AND ALBUTEROL SULFATE SCH: .5; 3 SOLUTION RESPIRATORY (INHALATION) at 21:37

## 2018-05-01 RX ADMIN — ASPIRIN SCH MG: 325 TABLET ORAL at 09:18

## 2018-05-01 RX ADMIN — IPRATROPIUM BROMIDE AND ALBUTEROL SULFATE SCH AMPUL: .5; 3 SOLUTION RESPIRATORY (INHALATION) at 15:10

## 2018-05-01 RX ADMIN — DICYCLOMINE HYDROCHLORIDE SCH MG: 10 CAPSULE ORAL at 15:34

## 2018-05-01 RX ADMIN — Medication SCH ML: at 09:28

## 2018-05-01 NOTE — PROGRESS NOTE
Assessment and Plan


Assessment:


Chest pain - with typical and atypical features, AMI ruled out, nuclear stress 

test TDS but grossly without evidence of ischemia, normal LV function


Sinus bradycardia - improving; asymptomatic; thyroid profile WNL


Left sided facial tingling / LUE weakness


HTN


HLP


H/o CVA


H/o DVT


H/o TIA


Elevated DDimer - chest CTA negative for PE





Plan:


Echo reviewed - EF 55-60%, impaired relaxation, LA mildly dilated. 


Reduce ASA to 81mg daily. 


Optimize anti-hypertensive regimen - cont losartan and resume home HCTZ 25mg 

daily. 


Await pulmonary consultation.


For head CT today per primary to further evaluate left sided facial tingling 

and LUE weakness. 





The patient has been seen in conjunction with Dr. Vigil who agrees with the 

assessment and plan of care.








Subjective


Date of service: 05/01/18


Principal diagnosis: cp


Interval history: 


pt resting in bed, c/o labile BPs overnight and some left sided facial tingling 

and left upper extremity weakness this AM. no complaints of chest pain 

overnight. 








Objective


 Last Vital Signs











Temp  98.2 F   05/01/18 07:40


 


Pulse  58 L  05/01/18 09:17


 


Resp  18   05/01/18 07:56


 


BP  152/56   05/01/18 09:17


 


Pulse Ox  98   05/01/18 07:59























- Physical Examination


General: No Apparent Distress


HEENT: Positive: PERRL, Normocephaly, Mucus Membranes Moist


Neck: Positive: neck supple, trachea midline


Cardiac: Positive: Reg Rate and Rhythm, S1/S2


Lungs: Positive: clear to auscultation


Neuro: Positive: Grossly Intact, Cranial Nerve 2-12 Intact


Abdomen: Positive: Soft.  Negative: Tender


Skin: Positive: Clear.  Negative: Rash, Wound


Musculoskeletal: No Fluid Collection, No Pain, Normal Range of Motion


Extremities: Absent: edema





- Imaging and Cardiology


EKG: report reviewed (sinus bradycardia heart rate of 51/m no acute ST-T wave 

changes EKG interpreted by me)





- Telemetry


EKG Rhythm: Sinus Rhythm

## 2018-05-01 NOTE — TREADMILL REPORT
THALLIUM STRESS TEST



Left  ventricular chamber size is within normal.  Perfusion study demonstrates

normal apical thinning, otherwise homogeneous uptake of the tracer in all

segments, no significant perfusion defects identified.  Gated analysis

demonstrates normal left ventricular systolic function, ejection fraction

greater than 70%.



CONCLUSION:  Normal myocardial perfusion study.





DD: 05/01/2018 09:30

DT: 05/01/2018 22:59

JOB# [id number]  [id number]

CA/NTS

## 2018-05-01 NOTE — DISCHARGE SUMMARY
Providers





- Providers


Date of Admission: 


04/28/18 18:30





Attending physician: 


ERIN TEE MD





 





04/29/18 10:59


Consult to Physician [CONS] Routine 


   Comment: 


   Consulting Provider: MIKAELA ROLDAN


   Physician Instructions: [facility]


   Reason For Exam: chest pain, SOB





04/30/18 14:59


Consult to Physician [CONS] Routine 


   Comment: 


   Consulting Provider: MARY ROSAS


   Physician Instructions: still complains SOB despite -ve stress and  CTA


   Reason For Exam: emphysema, SOB











Primary care physician: 


PRIMARY CARE MD








Hospitalization


Reason for admission: CHEST PAIN


Condition: Stable


Hospital course: 


63-year-old female with history of hypertension COPD and questionable 

cerebrovascular accident in the past comes in for left-sided chest pain.  His 

been going on since yesterday.  Also radiate into her left neck and left upper 

extremity.  Her chest.  Pressure is associated with shortness of breath and 

diaphoresis.  No palpitations.  No nausea no vomiting.  His been going on since 

yesterday.  Evening this morning is worsened and he gives a score of 48 or 10 

her last stress test was in 2015.  Patient says she may have a left heart 

catheterization.  No exacerbating or relieving factors. Patient followed with 

cardiology stress test was done and negative. Patient had elevated D.Dimer and 

was noted to have no Pulmonary embolism or DVT but Emphesma. She denies hx of 

tobacco or family hx of pulmonary disease. Pulmonary was consulted to evaluate.


ASA was increased to full dose and patient had recommendation to follow up with 

oncologist and hematologist due to elevated d.dimer and hx of cancer. She 

verbalized understanding. 





We also recommended following with GI for GERD.





Furthe cardiology work up revealed. 





Echo also revealed mild dilatation of the ascending aorta. I explained to her 

that she will need F/U Echo on an annual basis. She is also concerned regarding 

her wide pulse pressure. I told her that the cause is hypertension and 

atherosclerosis. 








Discharge Diagnosis


Chest pain Secondary to zenkeR


Zenker Diverticulm 


Emphysema


Hypertension


Hyperlipidemia


TIA


H/o CVA


H/o DVT


H/o cervuical cancer


Elevated DDimer - chest CTA negative for PE











Disposition: DC-01 TO HOME OR SELFCARE


Time spent for discharge: 35 mins





Core Measure Documentation





- Palliative Care


Palliative Care/ Comfort Measures: Not Applicable





- Core Measures


Any of the following diagnoses?: none





- VTE Discharge Requirements


Deep Vein Thrombosis/Pulmonary Embolism Present on Admission: No





Exam





- Physical Exam


Narrative exam: 


 VITAL SIGNS:  Reviewed.    


GENERAL:  The patient appeared well nourished and normally developed. Vital 

signs as documented.


HEAD:  No signs of head trauma.


EYES:  Pupils are equal.  Extraocular motions intact.  


EARS:  Hearing grossly intact.


MOUTH:  Oropharynx is normal. 


NECK:  No adenopathy, no JVD.   


CHEST:  Chest with clear breath sounds bilaterally.  No wheezes, rales, or 

rhonchi.  


CARDIAC:  Regular rate and rhythm.  S1 and S2, without murmurs, gallops, or 

rubs.


VASCULAR:  No Edema.  Peripheral pulses normal and equal in all extremities.


ABDOMEN:  Soft, without detectable tenderness.  No sign of distention.  No   

rebound or guarding, and no masses palpated.   Bowel Sounds normal.


MUSCULOSKELETAL:  Good range of motion of all major joints. Extremities without 

clubbing, cyanosis or edema.  


NEUROLOGIC EXAM:  Alert and oriented x 3.  No focal sensory or strength 

deficits.   Speech normal.  Follows commands.


PSYCHIATRIC:  Mood normal.


SKIN:  No rash or lesions.














- Constitutional


Vitals: 


 











Temp Pulse Resp BP Pulse Ox


 


 98.2 F   58 L  18   152/56   98 


 


 05/01/18 07:40  05/01/18 09:17  05/01/18 07:56  05/01/18 09:17  05/01/18 07:59














Plan


Activity: advance as tolerated, fall precautions


Diet: low cholesterol


Special Instructions: record daily weights, record daily BP diary, record blood 

sugar diary


Follow up with: 


PRIMARY CARE,MD [Primary Care Provider] - 7 Days


MARY ROSAS MD [Staff Physician] - 7 Days


MIKAELA ROLDAN MD [Staff Physician] - 7 Days


Prescriptions: 


ALBUTEROL Inhaler [ProAir HFA Inhaler] 2 puff IH QID PRN #1 inhalation


 PRN Reason: Shortness Of Breath


Aspirin 325 mg PO DAILY #30 tablet


Dicyclomine [Bentyl] 10 mg PO TID #14 capsule


Pantoprazole [Protonix TAB] 40 mg PO QDAY #30 tablet

## 2018-05-01 NOTE — CAT SCAN REPORT
CT HEAD WITHOUT CONTRAST:



HISTORY:  CVA.



TECHNIQUE:  Sequential 2.5mm CT images.



COMPARISON: none.



FINDINGS:



Cerebral Parenchyma: Within normal limits.



Cerebellum:  Within normal limits.



Brainstem:  Within normal limits.



Ventricles: Normal.



Sella:  Normal.



Extra-axial spaces:  Normal.



Basal Cisterns:  Normal.



Intracranial Hemorrhage:  None.



Midline Shift:  None.



Calvarium: Normal.



Sinuses: Normal.



Mastoid Air Cells:  Normal.



Visualized Orbits:  Normal.







IMPRESSION:

Cranial CT scan within normal limits.

## 2018-05-01 NOTE — CONSULTATION
History of Present Illness


Consult date: 05/01/18


Requesting physician: ERIN TEE


Reason for consult: COPD





Medications and Allergies


 Allergies











Allergy/AdvReac Type Severity Reaction Status Date / Time


 


cefdinir [From Omnicef] Allergy  Hives Verified 08/23/15 14:49


 


Penicillins Allergy  Unknown Verified 04/28/18 13:26











 Home Medications











 Medication  Instructions  Recorded  Confirmed  Last Taken  Type


 


ALPRAZolam 1 mg PO TID PRN 08/23/15 04/28/18 Unknown History


 


AtorvaSTATin 80 mg PO HS 08/23/15 04/28/18 Unknown History


 


Bentyl 10 mg PO TID 08/23/15 04/28/18 Unknown History


 


Lisinopril/Hydrochlorothiazide 1 tab PO DAILY 08/23/15 04/28/18 Unknown History


 


HYDROcodone/APAP 5-325 [[place] 1 each PO Q4HR PRN #20 tablet 08/26/15 04/28/18 

Unknown Rx





5-325 mg TAB]     


 


Hydrochlorothiazide [HCTZ] 25 mg PO QDAY 04/28/18 04/28/18 Unknown History


 


Aspirin 325 mg PO DAILY #30 tablet 05/01/18  Unknown Rx


 


Dicyclomine [Bentyl] 10 mg PO TID #14 capsule 05/01/18  Unknown Rx











Active Meds: 


Active Medications





Acetaminophen (Tylenol)  650 mg PO Q4H PRN


   PRN Reason: Pain MILD(1-3)/Fever >100.5/HA


Albuterol/Ipratropium (Duoneb *Not For Prn Use*)  1 ampul IH Q6HRT [facility]


   Last Admin: 05/01/18 15:10 Dose:  1 ampul


Alprazolam (Xanax)  1 mg PO TID PRN


   PRN Reason: Anxiety


   Last Admin: 04/30/18 21:36 Dose:  1 mg


Aspirin (Baby Aspirin)  81 mg PO QDAY [facility]


Atorvastatin Calcium (Lipitor)  80 mg PO QHS [facility]


   Last Admin: 04/30/18 21:37 Dose:  80 mg


Dicyclomine HCl (Bentyl)  10 mg PO TID [facility]


   Last Admin: 05/01/18 15:34 Dose:  10 mg


Heparin Sodium (Porcine) (Heparin)  5,000 unit SUB-Q Q12HR [facility]


   Last Admin: 05/01/18 09:19 Dose:  5,000 unit


Hydrochlorothiazide (Hctz)  25 mg PO QDAY [facility]


   Last Admin: 05/01/18 15:34 Dose:  25 mg


Losartan Potassium (Cozaar)  100 mg PO QDAY [facility]


   Last Admin: 05/01/18 09:17 Dose:  100 mg


Morphine Sulfate (Morphine)  2 mg IV Q4H PRN


   PRN Reason: Pain, Moderate (4-6)


   Last Admin: 05/01/18 15:34 Dose:  2 mg


Ondansetron HCl (Zofran)  4 mg IV Q4H PRN


   PRN Reason: Nausea And Vomiting


   Last Admin: 05/01/18 15:34 Dose:  4 mg


Oxycodone/Acetaminophen (Percocet 5/325)  1 tab PO Q6H PRN


   PRN Reason: Pain, Moderate (4-6)


   Last Admin: 04/29/18 19:39 Dose:  1 tab


Sodium Chloride (Sodium Chloride Flush Syringe 10 Ml)  10 ml IV BID [facility]


   Last Admin: 05/01/18 14:48 Dose:  Not Given


Sodium Chloride (Sodium Chloride Flush Syringe 10 Ml)  10 ml IV PRN PRN


   PRN Reason: LINE FLUSH











Physical Examination


Vital signs: 


 Vital Signs











Pulse Resp Pulse Ox


 


 51 L  9 L  94 


 


 04/28/18 14:18  04/28/18 14:18  04/28/18 14:18














Results





- Laboratory Findings


CBC and BMP: 


 04/29/18 07:10





 04/29/18 07:10


PT/INR, D-dimer











D-Dimer  348.49 ng/mlDDU (0-234)  H  04/29/18  13:38    








Abnormal lab findings: 


 Abnormal Labs











  04/28/18 04/29/18 04/29/18





  14:20 07:10 07:10


 


WBC  3.8 L  3.9 L 


 


RBC  5.08 H  


 


MCH  27 L  27 L 


 


RDW  15.8 H  15.8 H 


 


Lymph % (Auto)  47.3 H  45.5 H 


 


Mono % (Auto)  8.7 H  10.3 H 


 


Eos % (Auto)   5.2 H 


 


Seg Neutrophils %   37.4 L 


 


Seg Neutrophils #  1.5 L  1.5 L 


 


D-Dimer   


 


BUN    19 H


 


Total Protein    6.0 L


 


Albumin    3.3 L














  04/29/18





  13:38


 


WBC 


 


RBC 


 


MCH 


 


RDW 


 


Lymph % (Auto) 


 


Mono % (Auto) 


 


Eos % (Auto) 


 


Seg Neutrophils % 


 


Seg Neutrophils # 


 


D-Dimer  348.49 H


 


BUN 


 


Total Protein 


 


Albumin

## 2018-05-01 NOTE — PROGRESS NOTE
History


Interval history: 


Patient seen and examined in no acute distress. Patient reports improved 

symptoms, this morning had complaints of left sided numbness which has since 

resolved, was associated with a headache of 5/10 also resolved with no 

associated blurry vision.








Hospitalist Physical





- Constitutional


Vitals: 


 











Temp Pulse Resp BP Pulse Ox


 


 98.2 F   60   18   152/56   98 


 


 05/01/18 07:40  05/01/18 15:11  05/01/18 15:11  05/01/18 09:17  05/01/18 10:00











General appearance: Present: no acute distress, well-nourished





Results





- Labs


CBC & Chem 7: 


 04/29/18 07:10





 04/29/18 07:10


Labs: 


 Laboratory Last Values











WBC  3.9 K/mm3 (4.5-11.0)  L  04/29/18  07:10    


 


RBC  4.60 M/mm3 (3.65-5.03)   04/29/18  07:10    


 


Hgb  12.4 gm/dl (10.1-14.3)   04/29/18  07:10    


 


Hct  37.9 % (30.3-42.9)   04/29/18  07:10    


 


MCV  82 fl (79-97)   04/29/18  07:10    


 


MCH  27 pg (28-32)  L  04/29/18  07:10    


 


MCHC  33 % (30-34)   04/29/18  07:10    


 


RDW  15.8 % (13.2-15.2)  H  04/29/18  07:10    


 


Plt Count  186 K/mm3 (140-440)   04/29/18  07:10    


 


Lymph % (Auto)  45.5 % (13.4-35.0)  H  04/29/18  07:10    


 


Mono % (Auto)  10.3 % (0.0-7.3)  H  04/29/18  07:10    


 


Eos % (Auto)  5.2 % (0.0-4.3)  H  04/29/18  07:10    


 


Baso % (Auto)  1.6 % (0.0-1.8)   04/29/18  07:10    


 


Lymph #  1.8 K/mm3 (1.2-5.4)   04/29/18  07:10    


 


Mono #  0.4 K/mm3 (0.0-0.8)   04/29/18  07:10    


 


Eos #  0.2 K/mm3 (0.0-0.4)   04/29/18  07:10    


 


Baso #  0.1 K/mm3 (0.0-0.1)   04/29/18  07:10    


 


Seg Neutrophils %  37.4 % (40.0-70.0)  L  04/29/18  07:10    


 


Seg Neutrophils #  1.5 K/mm3 (1.8-7.7)  L  04/29/18  07:10    


 


D-Dimer  348.49 ng/mlDDU (0-234)  H  04/29/18  13:38    


 


Sodium  140 mmol/L (137-145)   04/29/18  07:10    


 


Potassium  4.4 mmol/L (3.6-5.0)   04/29/18  07:10    


 


Chloride  102.0 mmol/L ()   04/29/18  07:10    


 


Carbon Dioxide  26 mmol/L (22-30)   04/29/18  07:10    


 


Anion Gap  16 mmol/L  04/29/18  07:10    


 


BUN  19 mg/dL (7-17)  H  04/29/18  07:10    


 


Creatinine  1.0 mg/dL (0.7-1.2)   04/29/18  07:10    


 


Estimated GFR  > 60 ml/min  04/29/18  07:10    


 


BUN/Creatinine Ratio  19 %  04/29/18  07:10    


 


Glucose  84 mg/dL ()   04/29/18  07:10    


 


Hemoglobin A1c  5.9 % (4-6)   04/28/18  17:01    


 


Calcium  8.7 mg/dL (8.4-10.2)   04/29/18  07:10    


 


Total Bilirubin  0.30 mg/dL (0.1-1.2)   04/29/18  07:10    


 


AST  12 units/L (5-40)   04/29/18  07:10    


 


ALT  8 units/L (7-56)   04/29/18  07:10    


 


Alkaline Phosphatase  89 units/L ()   04/29/18  07:10    


 


Troponin T  < 0.010 ng/mL (0.00-0.029)   04/29/18  07:10    


 


Total Protein  6.0 g/dL (6.3-8.2)  L  04/29/18  07:10    


 


Albumin  3.3 g/dL (3.9-5)  L  04/29/18  07:10    


 


Albumin/Globulin Ratio  1.2 %  04/29/18  07:10    


 


TSH  0.919 mlU/mL (0.270-4.200)   04/30/18  13:27    


 


Free T4  1.37 ng/dL (0.76-1.46)   04/30/18  13:27

## 2018-05-02 NOTE — VASCULAR LAB REPORT
LOWER EXTREMITY VENOUS DUPLEX:



REASON FOR EXAM: Deep venous thrombosis.



COMMENTS ON THE RIGHT:

All veins visualized are freely compressible without evidence of

internal echogenicity.  Flow is spontaneous and phasic throughout.



COMMENTS ON THE LEFT:

All veins visualized are freely compressible without evidence of

internal echogenicity.  Flow is spontaneous and phasic throughout.



IMPRESSION:

No evidence of acute or chronic deep venous thrombosis in either

lower extremity.

## 2025-04-08 NOTE — EMERGENCY DEPARTMENT REPORT
HPI





- General


Chief Complaint: Chest Pain


Time Seen by Provider: 04/28/18 13:28





- HPI


HPI: 





Room 3





The patient is 63-year-old female presenting with a chief complaint of chest 

pain.  The patient states yesterday she developed substernal chest pain/

pressure has been waxing and waning.  Patient states she also developed 

numbness in her left neck shoulder and left upper extremity.  The patient 

states her chest pressure is associated with shortness of breath and 

diaphoresis.  Patient denies nausea/vomiting.  This morning the pressure 

worsened.  The patient gets her pain a score of 8/10.  The patient last stress 

test occurred in 2015.  The patient thought she had a cardiac catheterization 

performed at [facility] approximately 5 years ago





Location: [See above]


Duration: [See above]


Quality:  [See above]


Severity:  [See above]


Modifying factors: [see above]


Context: [see above]


Mode of transportation: [not driving]





ED Past Medical Hx





- Past Medical History


Hx Hypertension: Yes


Hx CVA: Yes (TIA)


Hx COPD: Yes


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 

Cholesterol





- Surgical History


Hx Cholecystectomy: Yes


Hx Breast Surgery: Yes (Bilateral Mastectomy)


Additional Surgical History: Bilateral Hip Replacememt, Partial Hysterectomy





- Family History


Family history: no significant





- Social History


Smoking Status: Former Smoker (none 20 years)


Substance Use Type: Marijuana





- Medications


Home Medications: 


 Home Medications











 Medication  Instructions  Recorded  Confirmed  Last Taken  Type


 


ALPRAZolam 1 mg PO TID PRN 08/23/15 08/23/15 Unknown History


 


Aspirin BABY CHEW TAB 81 mg PO DAILY 08/23/15 08/23/15 Unknown History


 


AtorvaSTATin 80 mg PO HS 08/23/15 08/23/15 Unknown History


 


Bentyl 10 mg PO TID 08/23/15 08/23/15 Unknown History


 


Lisinopril/Hydrochlorothiazide 1 tab PO DAILY 08/23/15 08/23/15 Unknown History


 


HYDROcodone/APAP 5-325 [[place] 1 each PO Q4HR PRN #20 tablet 08/26/15  Unknown Rx





5/325]     














ED Review of Systems


ROS: 


Stated complaint: CHEST PAIN


Other details as noted in HPI





Constitutional: diaphoresis


Respiratory: shortness of breath


Cardiovascular: chest pain


Gastrointestinal: denies: nausea, vomiting


Neurological: numbness





Physical Exam





- Physical Exam


Physical Exam: 





GENERAL: The patient is well-developed well-nourished female lying on stretcher 

not appearing to be in acute distress. []


HEENT: Normocephalic.  Atraumatic.  Extraocular motions are intact.  Patient 

has moist mucous membranes.


NECK: Supple.  Trachea midline


CHEST/LUNGS: Clear to auscultation.  There is no respiratory distress noted.


HEART/CARDIOVASCULAR: Regular.  There is no tachycardia.  There is no gallop 

rub or murmur.


ABDOMEN: Abdomen is soft, nontender.  Patient has normal bowel sounds.  There 

is no abdominal distention.


SKIN: There is no rash.  There is no edema.  There is no diaphoresis.


NEURO: The patient is awake, alert, and oriented.  The patient is cooperative. 

The patient has normal speech.  Patient complains of numbness in left upper 

extremity


MUSCULOSKELETAL:  There is no evidence of acute injury.





ED Medical Decision Making





- Lab Data


Result diagrams: 


 04/28/18 14:20





 04/28/18 14:20





 Laboratory Tests











  04/28/18 04/28/18





  14:20 14:20


 


WBC  3.8 L 


 


RBC  5.08 H 


 


Hgb  13.6 


 


Hct  42.8 


 


MCV  84 


 


MCH  27 L 


 


MCHC  32 


 


RDW  15.8 H 


 


Plt Count  215 


 


Lymph % (Auto)  47.3 H 


 


Mono % (Auto)  8.7 H 


 


Eos % (Auto)  2.1 


 


Baso % (Auto)  1.2 


 


Lymph #  1.8 


 


Mono #  0.3 


 


Eos #  0.1 


 


Baso #  0.0 


 


Seg Neutrophils %  40.7 


 


Seg Neutrophils #  1.5 L 


 


Sodium   137


 


Potassium   4.0


 


Chloride   101.0


 


Carbon Dioxide   22


 


Anion Gap   18


 


BUN   11


 


Creatinine   0.7


 


Estimated GFR   > 60


 


BUN/Creatinine Ratio   16


 


Glucose   78


 


Calcium   9.3


 


Troponin T   < 0.010














- EKG Data


-: EKG Interpreted by Me


EKG shows normal: sinus rhythm


Rate: bradycardia (51 bpm)





- EKG Data


When compared to previous EKG there are: previous EKG unavailable


Interpretation: other (no ischemic changes seen)





- Radiology Data


Radiology results: image reviewed (chest x-ray)


interpreted by me: 





Chest x-ray-no focal infiltrates, no pneumothorax





- Differential Diagnosis


ACS, GERD, pericarditis


Critical care attestation.: 


If time is entered above; I have spent that time in minutes in the direct care 

of this critically ill patient, excluding procedure time.








ED Disposition


Clinical Impression: 


 Chest pain





Disposition: DC-09 OP ADMIT IP TO THIS HOSP


Is pt being admited?: Yes


Does the pt Need Aspirin: Yes


Condition: Fair


Instructions:  Chest Pain (ED)


Time of Disposition: 15:17 (Hospitalist notified (Dr Mackey)) MOM for pt to CB.    not applicable